# Patient Record
Sex: FEMALE
[De-identification: names, ages, dates, MRNs, and addresses within clinical notes are randomized per-mention and may not be internally consistent; named-entity substitution may affect disease eponyms.]

---

## 2017-06-29 ENCOUNTER — CHARTING TRANS (OUTPATIENT)
Dept: OTHER | Age: 32
End: 2017-06-29

## 2017-09-01 ENCOUNTER — CHARTING TRANS (OUTPATIENT)
Dept: OTHER | Age: 32
End: 2017-09-01

## 2017-12-08 ENCOUNTER — CHARTING TRANS (OUTPATIENT)
Dept: OTHER | Age: 32
End: 2017-12-08

## 2017-12-08 ASSESSMENT — PAIN SCALES - GENERAL: PAINLEVEL_OUTOF10: 4

## 2018-01-18 PROBLEM — K11.20 SUBMANDIBULAR GLAND INFLAMMATION: Status: ACTIVE | Noted: 2018-01-18

## 2018-01-18 PROBLEM — R05.9 COUGH: Status: ACTIVE | Noted: 2018-01-18

## 2018-02-13 PROBLEM — E04.2 MULTIPLE THYROID NODULES: Status: ACTIVE | Noted: 2018-02-13

## 2018-02-13 PROCEDURE — 88173 CYTOPATH EVAL FNA REPORT: CPT | Performed by: OTOLARYNGOLOGY

## 2018-02-25 PROBLEM — R59.0 LAD (LYMPHADENOPATHY), CERVICAL: Status: ACTIVE | Noted: 2018-02-25

## 2018-09-07 PROCEDURE — 86780 TREPONEMA PALLIDUM: CPT | Performed by: INTERNAL MEDICINE

## 2018-09-07 PROCEDURE — 87491 CHLMYD TRACH DNA AMP PROBE: CPT | Performed by: INTERNAL MEDICINE

## 2018-09-07 PROCEDURE — 87591 N.GONORRHOEAE DNA AMP PROB: CPT | Performed by: INTERNAL MEDICINE

## 2018-09-07 PROCEDURE — 87480 CANDIDA DNA DIR PROBE: CPT | Performed by: INTERNAL MEDICINE

## 2018-09-07 PROCEDURE — 87389 HIV-1 AG W/HIV-1&-2 AB AG IA: CPT | Performed by: INTERNAL MEDICINE

## 2018-09-07 PROCEDURE — 87660 TRICHOMONAS VAGIN DIR PROBE: CPT | Performed by: INTERNAL MEDICINE

## 2018-09-07 PROCEDURE — 87510 GARDNER VAG DNA DIR PROBE: CPT | Performed by: INTERNAL MEDICINE

## 2018-10-24 ENCOUNTER — CHARTING TRANS (OUTPATIENT)
Dept: OTHER | Age: 33
End: 2018-10-24

## 2018-10-24 ENCOUNTER — LAB SERVICES (OUTPATIENT)
Dept: OTHER | Age: 33
End: 2018-10-24

## 2018-10-24 LAB — RAPID STREP GROUP A: NORMAL

## 2018-11-02 VITALS
RESPIRATION RATE: 17 BRPM | HEART RATE: 90 BPM | TEMPERATURE: 98.5 F | BODY MASS INDEX: 34.96 KG/M2 | WEIGHT: 190 LBS | OXYGEN SATURATION: 97 % | HEIGHT: 62 IN

## 2018-11-03 VITALS — TEMPERATURE: 98.5 F | RESPIRATION RATE: 16 BRPM | HEART RATE: 80 BPM | OXYGEN SATURATION: 99 %

## 2018-11-03 VITALS — RESPIRATION RATE: 16 BRPM | TEMPERATURE: 98.5 F | HEART RATE: 70 BPM | OXYGEN SATURATION: 98 %

## 2018-11-27 VITALS — RESPIRATION RATE: 16 BRPM | HEART RATE: 88 BPM | OXYGEN SATURATION: 98 % | TEMPERATURE: 98.3 F

## 2019-04-08 PROBLEM — S60.221A CONTUSION OF RIGHT HAND, INITIAL ENCOUNTER: Status: ACTIVE | Noted: 2019-04-08

## 2019-04-08 PROBLEM — S46.011A STRAIN OF RIGHT ROTATOR CUFF CAPSULE, INITIAL ENCOUNTER: Status: ACTIVE | Noted: 2019-04-08

## 2019-04-14 PROBLEM — M25.511 ACUTE PAIN OF RIGHT SHOULDER: Status: ACTIVE | Noted: 2019-04-14

## 2019-05-13 PROBLEM — S60.221A CONTUSION OF RIGHT HAND: Status: ACTIVE | Noted: 2019-04-08

## 2019-07-08 PROBLEM — L30.9 ECZEMA: Status: ACTIVE | Noted: 2019-04-12

## 2020-03-12 PROBLEM — G89.29 CHRONIC PAIN OF LEFT KNEE: Status: ACTIVE | Noted: 2020-03-12

## 2020-03-12 PROBLEM — M25.562 CHRONIC PAIN OF LEFT KNEE: Status: ACTIVE | Noted: 2020-03-12

## 2020-03-27 PROBLEM — H93.19 TINNITUS, UNSPECIFIED LATERALITY: Status: ACTIVE | Noted: 2020-03-27

## 2020-08-10 PROBLEM — Z71.3 ENCOUNTER FOR WEIGHT LOSS COUNSELING: Status: ACTIVE | Noted: 2020-08-10

## 2021-10-31 PROBLEM — L91.0 KELOID SCAR: Status: ACTIVE | Noted: 2021-10-31

## 2021-11-29 PROBLEM — Z71.3 ENCOUNTER FOR WEIGHT LOSS COUNSELING: Status: RESOLVED | Noted: 2020-08-10 | Resolved: 2021-11-29

## 2022-05-16 ENCOUNTER — NURSE NAVIGATOR ENCOUNTER (OUTPATIENT)
Dept: HEMATOLOGY/ONCOLOGY | Facility: HOSPITAL | Age: 37
End: 2022-05-16

## 2022-05-16 NOTE — PROGRESS NOTES
[id number] in regards to the email from the patient about inquiring about the high risk breast assessment clinic. Awaiting phone call back from the patient.